# Patient Record
Sex: FEMALE | Race: WHITE | URBAN - METROPOLITAN AREA
[De-identification: names, ages, dates, MRNs, and addresses within clinical notes are randomized per-mention and may not be internally consistent; named-entity substitution may affect disease eponyms.]

---

## 2018-10-03 ENCOUNTER — TELEPHONE (OUTPATIENT)
Dept: TRANSPLANT | Facility: CLINIC | Age: 48
End: 2018-10-03

## 2018-10-03 NOTE — LETTER
October 3, 2018    Dear Blanco Tan,   This letter is to inform you that you remain listed on the liver transplant wait list at the [facility]. You have been on the transplant list since 2008.   Our transplant team has attempted to reach you by telephone in order to review your status in our transplant program; however, we were not able to reach you.   At your earliest convenience, please contact us at [phone] to schedule a follow up visit. It is important for you to have regular follow up here if you wish to remain listed. If we do not hear from you, your case may be reviewed for removal from our transplant list.  Thank you for your attention to this matter. We look forward to hearing from you soon.   Sincerely,   Desiree Marks RN  Liver Transplant Coordinator

## 2018-10-03 NOTE — TELEPHONE ENCOUNTER
Pt listed for liver transplant, currently inactive. Last seen 2009 by Dr. Thomas. Call made to check in and discuss status of listing, VM left and letter sent.

## 2018-10-03 NOTE — LETTER
October 31, 2018      Dear Blanco Tan,  Your transplant team at the [facility] has been trying to reach you to schedule you for a follow up visit and review your status on our liver transplant list.   Messages were left on your voicemail on 10/3/18 and 10/31/18, and a letter was sent to you on 10/3/18; however we have not heard back from you.   We ask that all patients on our liver transplant list have regular follow up with our hepatologists. Please understand that if we do not hear from you by 11/21/18 your case will be reviewed at our selection conference, and you may be removed from our transplant list.   To schedule a follow up visit please call [phone].   Sincerely,   Desiree Marks RN  Liver Transplant Coordinator   [phone]

## 2018-11-27 ENCOUNTER — COMMITTEE REVIEW (OUTPATIENT)
Dept: TRANSPLANT | Facility: CLINIC | Age: 48
End: 2018-11-27

## 2018-11-27 NOTE — LETTER
Blanco Tan  [address] [facility]    [place] [id number]    November 28, 2018    Dear Blanco    This letter is being sent to notify you that you were removed from the liver transplant waiting list at the [facility] on 11/28/2018.  You were removed because you have not followed up in clinic and we have been unable to make contact with you.     We would like the opportunity to discuss this decision with you.  If you are not scheduled for a return visit with your provider here, please call [phone] or [phone] to make an appointment.  We recommend that you continue to follow with your care providers for continued management of your liver disease.    You have been sent this letter to comply with the [employer] ([employer]) guidelines.    Finally, attached is a letter from the [employer] ([employer]).  It describes the services and information offered to patients by [employer] and the [employer].    We appreciate having had the opportunity to participate in your care.  If you have questions, please feel free to call the Transplant Office at [phone] or call [phone] to schedule an appointment.    Sincerely,  Desiree Marks RN  Liver Transplant Coordinator       Encl: [employer] Letter

## 2018-11-27 NOTE — COMMITTEE REVIEW
Abdominal Committee Review Note     Evaluation Date: 1/11/2008  Committee Review Date: 11/27/2018    Organ being evaluated for: Liver    Transplant Phase: Waitlist  Transplant Status: Removed    Transplant Coordinator: Desiree Marks  Transplant Surgeon:       Referring Physician: Dinora Hernandez    Primary Diagnosis: Cirrhosis: Type C  Secondary Diagnosis:     Committee Review Members:  Nurse Michael Brunham LPN   Nutrition Brii Garrido, RD   Pharmacy Dung Samano, [facility]   Transplant Maurice Oshea MD, Iris Schmidt, RN, Jr Aayush Arango, OSMANY, Kizzy Levy, APRN CNP, Jaelyn Prieto, RN, Amy Hudson, RN, Shreya Nunez MD, Jordan Holman MD, Desiree Marks, OSMANY, Ema Musa MD, Charles Toledo MD, Heber Duque MD       Transplant Eligibility: Cirrhosis with MELD    Committee Review Decision: Remove    Relative Contraindications:     Absolute Contraindications:     Committee Chair Maurice Oshea MD verbally attested to the committee's decision.    Committee Discussion Details:     Delist due to lack of follow up and inability to reach pt.     Notification letter sent.